# Patient Record
Sex: MALE | Race: BLACK OR AFRICAN AMERICAN | NOT HISPANIC OR LATINO | URBAN - METROPOLITAN AREA
[De-identification: names, ages, dates, MRNs, and addresses within clinical notes are randomized per-mention and may not be internally consistent; named-entity substitution may affect disease eponyms.]

---

## 2018-05-27 ENCOUNTER — EMERGENCY (EMERGENCY)
Facility: HOSPITAL | Age: 28
LOS: 1 days | Discharge: HOME | End: 2018-05-27
Attending: EMERGENCY MEDICINE | Admitting: EMERGENCY MEDICINE
Payer: COMMERCIAL

## 2018-05-27 VITALS
OXYGEN SATURATION: 99 % | DIASTOLIC BLOOD PRESSURE: 94 MMHG | HEART RATE: 109 BPM | RESPIRATION RATE: 18 BRPM | SYSTOLIC BLOOD PRESSURE: 155 MMHG | TEMPERATURE: 99 F | WEIGHT: 198.86 LBS

## 2018-05-27 VITALS
HEART RATE: 85 BPM | DIASTOLIC BLOOD PRESSURE: 78 MMHG | OXYGEN SATURATION: 99 % | SYSTOLIC BLOOD PRESSURE: 138 MMHG | RESPIRATION RATE: 18 BRPM | TEMPERATURE: 99 F

## 2018-05-27 DIAGNOSIS — Z98.84 BARIATRIC SURGERY STATUS: Chronic | ICD-10-CM

## 2018-05-27 LAB
ALBUMIN SERPL ELPH-MCNC: 4.5 G/DL — SIGNIFICANT CHANGE UP (ref 3.3–5)
ALP SERPL-CCNC: 60 U/L — SIGNIFICANT CHANGE UP (ref 40–120)
ALT FLD-CCNC: 15 U/L — SIGNIFICANT CHANGE UP (ref 10–45)
ANION GAP SERPL CALC-SCNC: 15 MMOL/L — SIGNIFICANT CHANGE UP (ref 5–17)
APPEARANCE UR: CLEAR — SIGNIFICANT CHANGE UP
AST SERPL-CCNC: 23 U/L — SIGNIFICANT CHANGE UP (ref 10–40)
BACTERIA # UR AUTO: PRESENT /HPF
BASOPHILS NFR BLD AUTO: 0.1 % — SIGNIFICANT CHANGE UP (ref 0–2)
BILIRUB SERPL-MCNC: 0.8 MG/DL — SIGNIFICANT CHANGE UP (ref 0.2–1.2)
BILIRUB UR-MCNC: (no result)
BUN SERPL-MCNC: 8 MG/DL — SIGNIFICANT CHANGE UP (ref 7–23)
CALCIUM SERPL-MCNC: 9.6 MG/DL — SIGNIFICANT CHANGE UP (ref 8.4–10.5)
CHLORIDE SERPL-SCNC: 90 MMOL/L — LOW (ref 96–108)
CO2 SERPL-SCNC: 28 MMOL/L — SIGNIFICANT CHANGE UP (ref 22–31)
COLOR SPEC: YELLOW — SIGNIFICANT CHANGE UP
COMMENT - URINE: SIGNIFICANT CHANGE UP
CREAT SERPL-MCNC: 1.06 MG/DL — SIGNIFICANT CHANGE UP (ref 0.5–1.3)
DIFF PNL FLD: (no result)
EOSINOPHIL NFR BLD AUTO: 2.1 % — SIGNIFICANT CHANGE UP (ref 0–6)
EPI CELLS # UR: SIGNIFICANT CHANGE UP /HPF (ref 0–5)
GLUCOSE SERPL-MCNC: 128 MG/DL — HIGH (ref 70–99)
GLUCOSE UR QL: NEGATIVE — SIGNIFICANT CHANGE UP
HCT VFR BLD CALC: 44.3 % — SIGNIFICANT CHANGE UP (ref 39–50)
HGB BLD-MCNC: 15.8 G/DL — SIGNIFICANT CHANGE UP (ref 13–17)
HIV 1+2 AB+HIV1 P24 AG SERPL QL IA: SIGNIFICANT CHANGE UP
KETONES UR-MCNC: 40 MG/DL
LEUKOCYTE ESTERASE UR-ACNC: NEGATIVE — SIGNIFICANT CHANGE UP
LIDOCAIN IGE QN: 581 U/L — HIGH (ref 7–60)
LYMPHOCYTES # BLD AUTO: 12.1 % — LOW (ref 13–44)
MCHC RBC-ENTMCNC: 30.5 PG — SIGNIFICANT CHANGE UP (ref 27–34)
MCHC RBC-ENTMCNC: 35.7 G/DL — SIGNIFICANT CHANGE UP (ref 32–36)
MCV RBC AUTO: 85.5 FL — SIGNIFICANT CHANGE UP (ref 80–100)
MONOCYTES NFR BLD AUTO: 11 % — SIGNIFICANT CHANGE UP (ref 2–14)
NEUTROPHILS NFR BLD AUTO: 74.7 % — SIGNIFICANT CHANGE UP (ref 43–77)
NITRITE UR-MCNC: NEGATIVE — SIGNIFICANT CHANGE UP
PH UR: 5.5 — SIGNIFICANT CHANGE UP (ref 5–8)
PLATELET # BLD AUTO: 246 K/UL — SIGNIFICANT CHANGE UP (ref 150–400)
POTASSIUM SERPL-MCNC: 3.9 MMOL/L — SIGNIFICANT CHANGE UP (ref 3.5–5.3)
POTASSIUM SERPL-SCNC: 3.9 MMOL/L — SIGNIFICANT CHANGE UP (ref 3.5–5.3)
PROT SERPL-MCNC: 8.6 G/DL — HIGH (ref 6–8.3)
PROT UR-MCNC: 100 MG/DL
RBC # BLD: 5.18 M/UL — SIGNIFICANT CHANGE UP (ref 4.2–5.8)
RBC # FLD: 12.5 % — SIGNIFICANT CHANGE UP (ref 10.3–16.9)
RBC CASTS # UR COMP ASSIST: < 5 /HPF — SIGNIFICANT CHANGE UP
SODIUM SERPL-SCNC: 133 MMOL/L — LOW (ref 135–145)
SP GR SPEC: >=1.03 — SIGNIFICANT CHANGE UP (ref 1–1.03)
UROBILINOGEN FLD QL: 1 E.U./DL — SIGNIFICANT CHANGE UP
WBC # BLD: 8.4 K/UL — SIGNIFICANT CHANGE UP (ref 3.8–10.5)
WBC # FLD AUTO: 8.4 K/UL — SIGNIFICANT CHANGE UP (ref 3.8–10.5)
WBC UR QL: < 5 /HPF — SIGNIFICANT CHANGE UP

## 2018-05-27 PROCEDURE — 99284 EMERGENCY DEPT VISIT MOD MDM: CPT | Mod: 25

## 2018-05-27 PROCEDURE — 87389 HIV-1 AG W/HIV-1&-2 AB AG IA: CPT

## 2018-05-27 PROCEDURE — 96374 THER/PROPH/DIAG INJ IV PUSH: CPT

## 2018-05-27 PROCEDURE — 81001 URINALYSIS AUTO W/SCOPE: CPT

## 2018-05-27 PROCEDURE — 74019 RADEX ABDOMEN 2 VIEWS: CPT

## 2018-05-27 PROCEDURE — 96375 TX/PRO/DX INJ NEW DRUG ADDON: CPT

## 2018-05-27 PROCEDURE — 85025 COMPLETE CBC W/AUTO DIFF WBC: CPT

## 2018-05-27 PROCEDURE — 74019 RADEX ABDOMEN 2 VIEWS: CPT | Mod: 26

## 2018-05-27 PROCEDURE — 80053 COMPREHEN METABOLIC PANEL: CPT

## 2018-05-27 PROCEDURE — 36415 COLL VENOUS BLD VENIPUNCTURE: CPT

## 2018-05-27 PROCEDURE — 93010 ELECTROCARDIOGRAM REPORT: CPT

## 2018-05-27 PROCEDURE — 93005 ELECTROCARDIOGRAM TRACING: CPT

## 2018-05-27 PROCEDURE — 83690 ASSAY OF LIPASE: CPT

## 2018-05-27 RX ORDER — MORPHINE SULFATE 50 MG/1
4 CAPSULE, EXTENDED RELEASE ORAL ONCE
Qty: 0 | Refills: 0 | Status: DISCONTINUED | OUTPATIENT
Start: 2018-05-27 | End: 2018-05-27

## 2018-05-27 RX ORDER — SODIUM CHLORIDE 9 MG/ML
1000 INJECTION INTRAMUSCULAR; INTRAVENOUS; SUBCUTANEOUS ONCE
Qty: 0 | Refills: 0 | Status: COMPLETED | OUTPATIENT
Start: 2018-05-27 | End: 2018-05-27

## 2018-05-27 RX ORDER — PANTOPRAZOLE SODIUM 20 MG/1
40 TABLET, DELAYED RELEASE ORAL ONCE
Qty: 0 | Refills: 0 | Status: COMPLETED | OUTPATIENT
Start: 2018-05-27 | End: 2018-05-27

## 2018-05-27 RX ORDER — LIDOCAINE 4 G/100G
10 CREAM TOPICAL ONCE
Qty: 0 | Refills: 0 | Status: COMPLETED | OUTPATIENT
Start: 2018-05-27 | End: 2018-05-27

## 2018-05-27 RX ADMIN — Medication 30 MILLILITER(S): at 09:55

## 2018-05-27 RX ADMIN — PANTOPRAZOLE SODIUM 40 MILLIGRAM(S): 20 TABLET, DELAYED RELEASE ORAL at 09:55

## 2018-05-27 RX ADMIN — MORPHINE SULFATE 4 MILLIGRAM(S): 50 CAPSULE, EXTENDED RELEASE ORAL at 11:40

## 2018-05-27 RX ADMIN — SODIUM CHLORIDE 1000 MILLILITER(S): 9 INJECTION INTRAMUSCULAR; INTRAVENOUS; SUBCUTANEOUS at 09:55

## 2018-05-27 RX ADMIN — LIDOCAINE 10 MILLILITER(S): 4 CREAM TOPICAL at 09:55

## 2018-05-27 RX ADMIN — SODIUM CHLORIDE 1000 MILLILITER(S): 9 INJECTION INTRAMUSCULAR; INTRAVENOUS; SUBCUTANEOUS at 11:18

## 2018-05-27 RX ADMIN — SODIUM CHLORIDE 1000 MILLILITER(S): 9 INJECTION INTRAMUSCULAR; INTRAVENOUS; SUBCUTANEOUS at 11:41

## 2018-05-27 RX ADMIN — MORPHINE SULFATE 4 MILLIGRAM(S): 50 CAPSULE, EXTENDED RELEASE ORAL at 11:52

## 2018-05-27 NOTE — ED ADULT NURSE NOTE - CHPI ED SYMPTOMS NEG
no burning urination/no hematuria/no fever/no chills/no blood in stool/no diarrhea/no dysuria/no abdominal distension

## 2018-05-27 NOTE — ED PROVIDER NOTE - MEDICAL DECISION MAKING DETAILS
Pt with epigastric pain after binge drinking episode, D Dx , Most likley pancreatitis vs gastritis, lipase pending. consider SBO as ho of gastric bypass but lower suspician, will screen with xray. less likely gallbladder pathology, will evaluate RUQ labs. will in meantime empirically treat for gastritis .

## 2018-05-27 NOTE — ED PROVIDER NOTE - OBJECTIVE STATEMENT
26 yo with ho of Gastric bypass 2011, HTN, reports increased binge drinking recently, last drink 4 days ago , no ho of withdrawals or seizures. reports onset of upper abd pain radiating to mid back after last binge episode. no ho of pancreatitis /  gastritis known although had similar pain episode which resolved on own after drinking episode last December.  nausea and vomiting associated, no ho of SBO.  no CP. no lightheadness. no black or bloody stools, + constipation, after took a few percocet's for pain over the last few days, + passing gas.

## 2018-05-27 NOTE — ED PROVIDER NOTE - PROGRESS NOTE DETAILS
pancreatitis confirmed, 3 L NS given, pain improved, advised admission however  pt unwilling, discussed risks, advised liquid diet and follow up tues with pcp.

## 2018-05-31 DIAGNOSIS — I10 ESSENTIAL (PRIMARY) HYPERTENSION: ICD-10-CM

## 2018-05-31 DIAGNOSIS — Z79.891 LONG TERM (CURRENT) USE OF OPIATE ANALGESIC: ICD-10-CM

## 2018-05-31 DIAGNOSIS — Z79.899 OTHER LONG TERM (CURRENT) DRUG THERAPY: ICD-10-CM

## 2018-05-31 DIAGNOSIS — K85.90 ACUTE PANCREATITIS WITHOUT NECROSIS OR INFECTION, UNSPECIFIED: ICD-10-CM

## 2018-05-31 DIAGNOSIS — R10.13 EPIGASTRIC PAIN: ICD-10-CM

## 2018-08-22 ENCOUNTER — INPATIENT (INPATIENT)
Facility: HOSPITAL | Age: 28
LOS: 1 days | Discharge: HOME | DRG: 439 | End: 2018-08-24
Attending: STUDENT IN AN ORGANIZED HEALTH CARE EDUCATION/TRAINING PROGRAM | Admitting: STUDENT IN AN ORGANIZED HEALTH CARE EDUCATION/TRAINING PROGRAM
Payer: COMMERCIAL

## 2018-08-22 VITALS
TEMPERATURE: 98 F | RESPIRATION RATE: 18 BRPM | SYSTOLIC BLOOD PRESSURE: 132 MMHG | OXYGEN SATURATION: 98 % | HEART RATE: 79 BPM | DIASTOLIC BLOOD PRESSURE: 89 MMHG

## 2018-08-22 DIAGNOSIS — I10 ESSENTIAL (PRIMARY) HYPERTENSION: ICD-10-CM

## 2018-08-22 DIAGNOSIS — K85.90 ACUTE PANCREATITIS WITHOUT NECROSIS OR INFECTION, UNSPECIFIED: ICD-10-CM

## 2018-08-22 DIAGNOSIS — Z98.84 BARIATRIC SURGERY STATUS: ICD-10-CM

## 2018-08-22 DIAGNOSIS — Z98.84 BARIATRIC SURGERY STATUS: Chronic | ICD-10-CM

## 2018-08-22 DIAGNOSIS — R63.8 OTHER SYMPTOMS AND SIGNS CONCERNING FOOD AND FLUID INTAKE: ICD-10-CM

## 2018-08-22 DIAGNOSIS — Z29.9 ENCOUNTER FOR PROPHYLACTIC MEASURES, UNSPECIFIED: ICD-10-CM

## 2018-08-22 LAB
ALBUMIN SERPL ELPH-MCNC: 4 G/DL — SIGNIFICANT CHANGE UP (ref 3.3–5)
ALBUMIN SERPL ELPH-MCNC: 4.7 G/DL — SIGNIFICANT CHANGE UP (ref 3.3–5)
ALP SERPL-CCNC: 48 U/L — SIGNIFICANT CHANGE UP (ref 40–120)
ALP SERPL-CCNC: 57 U/L — SIGNIFICANT CHANGE UP (ref 40–120)
ALT FLD-CCNC: 17 U/L — SIGNIFICANT CHANGE UP (ref 10–45)
ALT FLD-CCNC: 22 U/L — SIGNIFICANT CHANGE UP (ref 10–45)
ANION GAP SERPL CALC-SCNC: 13 MMOL/L — SIGNIFICANT CHANGE UP (ref 5–17)
ANION GAP SERPL CALC-SCNC: 17 MMOL/L — SIGNIFICANT CHANGE UP (ref 5–17)
AST SERPL-CCNC: 21 U/L — SIGNIFICANT CHANGE UP (ref 10–40)
AST SERPL-CCNC: 26 U/L — SIGNIFICANT CHANGE UP (ref 10–40)
BASOPHILS NFR BLD AUTO: 0.1 % — SIGNIFICANT CHANGE UP (ref 0–2)
BILIRUB SERPL-MCNC: 0.6 MG/DL — SIGNIFICANT CHANGE UP (ref 0.2–1.2)
BILIRUB SERPL-MCNC: 0.7 MG/DL — SIGNIFICANT CHANGE UP (ref 0.2–1.2)
BUN SERPL-MCNC: 14 MG/DL — SIGNIFICANT CHANGE UP (ref 7–23)
BUN SERPL-MCNC: 17 MG/DL — SIGNIFICANT CHANGE UP (ref 7–23)
CALCIUM SERPL-MCNC: 8.8 MG/DL — SIGNIFICANT CHANGE UP (ref 8.4–10.5)
CALCIUM SERPL-MCNC: 9.4 MG/DL — SIGNIFICANT CHANGE UP (ref 8.4–10.5)
CHLORIDE SERPL-SCNC: 98 MMOL/L — SIGNIFICANT CHANGE UP (ref 96–108)
CHLORIDE SERPL-SCNC: 99 MMOL/L — SIGNIFICANT CHANGE UP (ref 96–108)
CHOLEST SERPL-MCNC: 149 MG/DL — SIGNIFICANT CHANGE UP (ref 10–199)
CO2 SERPL-SCNC: 23 MMOL/L — SIGNIFICANT CHANGE UP (ref 22–31)
CO2 SERPL-SCNC: 23 MMOL/L — SIGNIFICANT CHANGE UP (ref 22–31)
CREAT SERPL-MCNC: 0.9 MG/DL — SIGNIFICANT CHANGE UP (ref 0.5–1.3)
CREAT SERPL-MCNC: 1.03 MG/DL — SIGNIFICANT CHANGE UP (ref 0.5–1.3)
EOSINOPHIL NFR BLD AUTO: 0.6 % — SIGNIFICANT CHANGE UP (ref 0–6)
ETHANOL SERPL-MCNC: 109 MG/DL — HIGH (ref 0–10)
GLUCOSE SERPL-MCNC: 100 MG/DL — HIGH (ref 70–99)
GLUCOSE SERPL-MCNC: 112 MG/DL — HIGH (ref 70–99)
HCT VFR BLD CALC: 41 % — SIGNIFICANT CHANGE UP (ref 39–50)
HDLC SERPL-MCNC: 96 MG/DL — SIGNIFICANT CHANGE UP
HGB BLD-MCNC: 14.6 G/DL — SIGNIFICANT CHANGE UP (ref 13–17)
HIV 1+2 AB+HIV1 P24 AG SERPL QL IA: SIGNIFICANT CHANGE UP
LIDOCAIN IGE QN: >600 U/L — SIGNIFICANT CHANGE UP (ref 7–60)
LIPID PNL WITH DIRECT LDL SERPL: 44 MG/DL — SIGNIFICANT CHANGE UP
LYMPHOCYTES # BLD AUTO: 19.9 % — SIGNIFICANT CHANGE UP (ref 13–44)
MCHC RBC-ENTMCNC: 31.3 PG — SIGNIFICANT CHANGE UP (ref 27–34)
MCHC RBC-ENTMCNC: 35.6 G/DL — SIGNIFICANT CHANGE UP (ref 32–36)
MCV RBC AUTO: 87.8 FL — SIGNIFICANT CHANGE UP (ref 80–100)
MONOCYTES NFR BLD AUTO: 7.6 % — SIGNIFICANT CHANGE UP (ref 2–14)
NEUTROPHILS NFR BLD AUTO: 71.8 % — SIGNIFICANT CHANGE UP (ref 43–77)
PLATELET # BLD AUTO: 292 K/UL — SIGNIFICANT CHANGE UP (ref 150–400)
POTASSIUM SERPL-MCNC: 4.3 MMOL/L — SIGNIFICANT CHANGE UP (ref 3.5–5.3)
POTASSIUM SERPL-MCNC: 4.3 MMOL/L — SIGNIFICANT CHANGE UP (ref 3.5–5.3)
POTASSIUM SERPL-SCNC: 4.3 MMOL/L — SIGNIFICANT CHANGE UP (ref 3.5–5.3)
POTASSIUM SERPL-SCNC: 4.3 MMOL/L — SIGNIFICANT CHANGE UP (ref 3.5–5.3)
PROT SERPL-MCNC: 6.5 G/DL — SIGNIFICANT CHANGE UP (ref 6–8.3)
PROT SERPL-MCNC: 7.4 G/DL — SIGNIFICANT CHANGE UP (ref 6–8.3)
RBC # BLD: 4.67 M/UL — SIGNIFICANT CHANGE UP (ref 4.2–5.8)
RBC # FLD: 13.5 % — SIGNIFICANT CHANGE UP (ref 10.3–16.9)
SODIUM SERPL-SCNC: 135 MMOL/L — SIGNIFICANT CHANGE UP (ref 135–145)
SODIUM SERPL-SCNC: 138 MMOL/L — SIGNIFICANT CHANGE UP (ref 135–145)
TOTAL CHOLESTEROL/HDL RATIO MEASUREMENT: 1.6 RATIO — LOW (ref 3.4–9.6)
TRIGL SERPL-MCNC: 43 MG/DL — SIGNIFICANT CHANGE UP (ref 10–149)
WBC # BLD: 8.1 K/UL — SIGNIFICANT CHANGE UP (ref 3.8–10.5)
WBC # FLD AUTO: 8.1 K/UL — SIGNIFICANT CHANGE UP (ref 3.8–10.5)

## 2018-08-22 PROCEDURE — 76705 ECHO EXAM OF ABDOMEN: CPT | Mod: 26

## 2018-08-22 PROCEDURE — 99223 1ST HOSP IP/OBS HIGH 75: CPT | Mod: GC

## 2018-08-22 PROCEDURE — 93010 ELECTROCARDIOGRAM REPORT: CPT

## 2018-08-22 PROCEDURE — 99285 EMERGENCY DEPT VISIT HI MDM: CPT | Mod: 25

## 2018-08-22 RX ORDER — SODIUM CHLORIDE 9 MG/ML
1000 INJECTION, SOLUTION INTRAVENOUS
Qty: 0 | Refills: 0 | Status: DISCONTINUED | OUTPATIENT
Start: 2018-08-22 | End: 2018-08-24

## 2018-08-22 RX ORDER — ONDANSETRON 8 MG/1
4 TABLET, FILM COATED ORAL EVERY 4 HOURS
Qty: 0 | Refills: 0 | Status: DISCONTINUED | OUTPATIENT
Start: 2018-08-22 | End: 2018-08-24

## 2018-08-22 RX ORDER — MORPHINE SULFATE 50 MG/1
4 CAPSULE, EXTENDED RELEASE ORAL EVERY 4 HOURS
Qty: 0 | Refills: 0 | Status: DISCONTINUED | OUTPATIENT
Start: 2018-08-22 | End: 2018-08-24

## 2018-08-22 RX ORDER — ONDANSETRON 8 MG/1
4 TABLET, FILM COATED ORAL ONCE
Qty: 0 | Refills: 0 | Status: COMPLETED | OUTPATIENT
Start: 2018-08-22 | End: 2018-08-22

## 2018-08-22 RX ORDER — MORPHINE SULFATE 50 MG/1
2 CAPSULE, EXTENDED RELEASE ORAL ONCE
Qty: 0 | Refills: 0 | Status: DISCONTINUED | OUTPATIENT
Start: 2018-08-22 | End: 2018-08-22

## 2018-08-22 RX ORDER — SODIUM CHLORIDE 9 MG/ML
1000 INJECTION INTRAMUSCULAR; INTRAVENOUS; SUBCUTANEOUS ONCE
Qty: 0 | Refills: 0 | Status: COMPLETED | OUTPATIENT
Start: 2018-08-22 | End: 2018-08-22

## 2018-08-22 RX ORDER — MORPHINE SULFATE 50 MG/1
4 CAPSULE, EXTENDED RELEASE ORAL ONCE
Qty: 0 | Refills: 0 | Status: DISCONTINUED | OUTPATIENT
Start: 2018-08-22 | End: 2018-08-22

## 2018-08-22 RX ORDER — MORPHINE SULFATE 50 MG/1
2 CAPSULE, EXTENDED RELEASE ORAL EVERY 4 HOURS
Qty: 0 | Refills: 0 | Status: DISCONTINUED | OUTPATIENT
Start: 2018-08-22 | End: 2018-08-24

## 2018-08-22 RX ORDER — MORPHINE SULFATE 50 MG/1
2 CAPSULE, EXTENDED RELEASE ORAL EVERY 4 HOURS
Qty: 0 | Refills: 0 | Status: DISCONTINUED | OUTPATIENT
Start: 2018-08-22 | End: 2018-08-22

## 2018-08-22 RX ADMIN — ONDANSETRON 4 MILLIGRAM(S): 8 TABLET, FILM COATED ORAL at 04:42

## 2018-08-22 RX ADMIN — MORPHINE SULFATE 4 MILLIGRAM(S): 50 CAPSULE, EXTENDED RELEASE ORAL at 16:50

## 2018-08-22 RX ADMIN — MORPHINE SULFATE 4 MILLIGRAM(S): 50 CAPSULE, EXTENDED RELEASE ORAL at 19:45

## 2018-08-22 RX ADMIN — MORPHINE SULFATE 4 MILLIGRAM(S): 50 CAPSULE, EXTENDED RELEASE ORAL at 22:38

## 2018-08-22 RX ADMIN — SODIUM CHLORIDE 1000 MILLILITER(S): 9 INJECTION INTRAMUSCULAR; INTRAVENOUS; SUBCUTANEOUS at 05:53

## 2018-08-22 RX ADMIN — SODIUM CHLORIDE 150 MILLILITER(S): 9 INJECTION, SOLUTION INTRAVENOUS at 22:39

## 2018-08-22 RX ADMIN — MORPHINE SULFATE 4 MILLIGRAM(S): 50 CAPSULE, EXTENDED RELEASE ORAL at 11:49

## 2018-08-22 RX ADMIN — MORPHINE SULFATE 2 MILLIGRAM(S): 50 CAPSULE, EXTENDED RELEASE ORAL at 15:00

## 2018-08-22 RX ADMIN — SODIUM CHLORIDE 1000 MILLILITER(S): 9 INJECTION INTRAMUSCULAR; INTRAVENOUS; SUBCUTANEOUS at 05:51

## 2018-08-22 RX ADMIN — MORPHINE SULFATE 2 MILLIGRAM(S): 50 CAPSULE, EXTENDED RELEASE ORAL at 14:30

## 2018-08-22 RX ADMIN — MORPHINE SULFATE 2 MILLIGRAM(S): 50 CAPSULE, EXTENDED RELEASE ORAL at 19:56

## 2018-08-22 RX ADMIN — MORPHINE SULFATE 2 MILLIGRAM(S): 50 CAPSULE, EXTENDED RELEASE ORAL at 09:33

## 2018-08-22 RX ADMIN — MORPHINE SULFATE 4 MILLIGRAM(S): 50 CAPSULE, EXTENDED RELEASE ORAL at 04:55

## 2018-08-22 RX ADMIN — MORPHINE SULFATE 2 MILLIGRAM(S): 50 CAPSULE, EXTENDED RELEASE ORAL at 08:27

## 2018-08-22 RX ADMIN — SODIUM CHLORIDE 150 MILLILITER(S): 9 INJECTION, SOLUTION INTRAVENOUS at 19:35

## 2018-08-22 RX ADMIN — MORPHINE SULFATE 2 MILLIGRAM(S): 50 CAPSULE, EXTENDED RELEASE ORAL at 08:12

## 2018-08-22 RX ADMIN — SODIUM CHLORIDE 1000 MILLILITER(S): 9 INJECTION INTRAMUSCULAR; INTRAVENOUS; SUBCUTANEOUS at 04:55

## 2018-08-22 RX ADMIN — SODIUM CHLORIDE 250 MILLILITER(S): 9 INJECTION, SOLUTION INTRAVENOUS at 08:27

## 2018-08-22 RX ADMIN — MORPHINE SULFATE 4 MILLIGRAM(S): 50 CAPSULE, EXTENDED RELEASE ORAL at 05:10

## 2018-08-22 RX ADMIN — MORPHINE SULFATE 4 MILLIGRAM(S): 50 CAPSULE, EXTENDED RELEASE ORAL at 23:00

## 2018-08-22 NOTE — H&P ADULT - PROBLEM SELECTOR PLAN 2
takes lisinopril/HCTZ at home   current SBP in the 130s  will hold anti-hypertensives for now as lisinopril has an increased risk of causing pancreatitis

## 2018-08-22 NOTE — ED PROVIDER NOTE - OBJECTIVE STATEMENT
26 y/o m hx EtOH abuse presents c/o epigastric abd pain, n/v tonight.  Pt stating has been drinking heavier than usual in the past few days, vomiting started acutely last night.   Pt denies fever, diarrhea, CP, SOB, all other ROS negative.

## 2018-08-22 NOTE — H&P ADULT - HISTORY OF PRESENT ILLNESS
27 year old male with PMH HTN, alcohol abuse disorder, Gastric bypass (2011) and on Truvada (prep) who p/w severe epigastric pain and NBNB vomiting since 3AM. Patient went out last night to a bar and had 5-6 strong mixed drinks. He then began to have epigastric pain and NBNB vomiting and presented to [facility] ED at 4AM. The pain is 7/10 and is worse with positional changes such as sitting up and pain improves lying on his side. He had about 3 episodes of NBNB vomiting that continued when he was in the ER and stopped after he was given Zofran. 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 He is not sure what precipitates his drinking if it is stress related but does endorse alcoholism runs in his family. He also see a psychiatrist "Dr. CRAIN" who sees him for his alcohol use disorder. He does feel guilty and believes he needs help and has tried to quit drinking without any success, but currently has no time to go to rehab secondary to being in medical school. He denies any chest pain, shortness of breath, cough, weight loss, night sweats, fevers, chills or diarrhea.   Vitals in ED: T: 98.4, HR: 79, BP: 132/89, RR: 18, SpO2: 98% on RA. Total of 4mg morphine given for pain, zofran given for nausea and 2L NS given. 27 year old male with PMH HTN, alcohol abuse disorder, Gastric bypass (2011) and on Truvada (prep) who p/w severe epigastric pain and NBNB vomiting since 3AM. Patient went out last night to a bar and had 5-6 strong mixed drinks. He then began to have epigastric pain and NBNB vomiting and presented to [facility] ED at 4AM. The pain is 7/10 and is worse with positional changes such as sitting up and pain improves lying on his side. He had about 3 episodes of NBNB vomiting that continued when he was in the ER and stopped after he was given Zofran. 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 He is not sure what precipitates his drinking if it is stress related but does endorse alcoholism runs in his family. He also see a psychiatrist "Dr. CRAIN" who sees him for his alcohol use disorder. He does feel guilty and believes he needs help and has tried to quit drinking without any success, but currently has no time to go to rehab secondary to being in medical school. He does not endorse eating fatty foods. He denies any chest pain, shortness of breath, cough, weight loss, night sweats, fevers, chills or diarrhea.   Vitals in ED: T: 98.4, HR: 79, BP: 132/89, RR: 18, SpO2: 98% on RA. Total of 4mg morphine given for pain, zofran given for nausea and 2L NS given. Lipase at 581.

## 2018-08-22 NOTE — H&P ADULT - PROBLEM SELECTOR PLAN 1
Patient presented with epigastric pain and NBNB vomiting after binge drinking  chronic alcoholic s/p 5-6 mixed drinks; last drink at 2AM last night   current CIWA is 1; only complains of nausea; no headache, tremors or anxiety   No SIRS criteria met; patient without any leukocytosis, no fever, not tachycardic or tachypneic and is stable   lipase elevated at 581  BAL at 109   s/p 2L NS   s/p zofran and 4mg morphine  pain controlled with morphine and will continue   started on LR at 250/hour   f/u lipid panel   will advance diet as tolerated Patient presented with epigastric pain and NBNB vomiting after binge drinking  chronic alcoholic s/p 5-6 mixed drinks; last drink at 2AM last night   current CIWA is 1; only complains of nausea; no headache, tremors or anxiety   No SIRS criteria met; patient without any leukocytosis, no fever, not tachycardic or tachypneic and is stable   lipase elevated at 581  BAL at 109   s/p 2L NS   s/p zofran and 4mg morphine  pain controlled with morphine and will continue   started on LR at 250/hour   f/u lipid panel   f/u repeat CMP   will advance diet as tolerated Patient presented with epigastric pain and NBNB vomiting after binge drinking  chronic alcoholic s/p 5-6 mixed drinks; last drink at 2AM last night   current CIWA is 1; only complains of nausea; no headache, tremors or anxiety   No SIRS criteria met; patient without any leukocytosis, no fever, not tachycardic or tachypneic and is stable   lipase elevated at 581  BAL at 109   s/p 2L NS   s/p zofran and 4mg morphine  pain controlled with morphine and will continue   started on LR at 250/hour   f/u lipid panel   f/u repeat CMP   less likely gallstone induced pancreatitis but will r/o with abdominal u/s   will advance diet as tolerated

## 2018-08-22 NOTE — ED PROVIDER NOTE - ATTENDING CONTRIBUTION TO CARE
increased abd pain with history of alcohol abuse.  labs suggestive of pancreatitis.  abd soft but tender epigastric.  otherwise non focal exam.  plan ivf hydration, pain control.  admit for further treatment

## 2018-08-22 NOTE — H&P ADULT - ASSESSMENT
This is a 27 year old male with PMH HTN, gastric bypass, and on Truvada for prep who p/w epigastric pain and NBNB vomiting found to have alcohol induced pancreatitis.

## 2018-08-22 NOTE — H&P ADULT - NSHPPHYSICALEXAM_GEN_ALL_CORE
Vital Signs Last 12 Hrs  T(F): 98.2 (08-22-18 @ 05:55), Max: 98.4 (08-22-18 @ 03:41)  HR: 74 (08-22-18 @ 05:55) (74 - 79)  BP: 134/81 (08-22-18 @ 05:55) (132/89 - 134/81)  BP(mean): --  RR: 18 (08-22-18 @ 05:55) (18 - 18)  SpO2: 98% (08-22-18 @ 05:55) (98% - 98%)    PHYSICAL EXAM:  Constitutional: NAD, comfortable in bed.  HEENT: NC/AT, PERRLA, EOMI, no conjunctival pallor or scleral icterus, MMM  Neck: Supple, no JVD  Respiratory: Normal rate, rhythm, depth, effort. CTAB. No w/r/r.   Cardiovascular: RRR, normal S1 and S2, no m/r/g.   Gastrointestinal: +BS, soft NTND, epigastric tenderness to deep palpation, without any guarding or rebound tenderness, no palpable masses   Extremities: wwp; no cyanosis, clubbing or edema.   Vascular: Pulses equal and strong throughout.   Neurological: AAOx3, no CN deficits, strength and sensation intact throughout.   Skin: No gross skin abnormalities or rashes

## 2018-08-22 NOTE — H&P ADULT - PROBLEM SELECTOR PLAN 4
Low risk no need for DVT ppx   patient mobile and no need for SCDs/heparin Low risk no need for DVT ppx   patient mobile and no need for SCDs/heparin  On Truvada for prep; currently not sexually active and no recent exposure and has agreed to get HIV tested in hospital Low risk no need for DVT ppx   patient mobile. ambulatory. IMPROVE score 0.   no need for SCDs/heparin  On Truvada for prep; currently not sexually active and no recent exposure and has agreed to get HIV tested in hospital

## 2018-08-22 NOTE — ED ADULT NURSE NOTE - NSIMPLEMENTINTERV_GEN_ALL_ED
Implemented All Universal Safety Interventions:  [place] to call system. Call bell, personal items and telephone within reach. Instruct patient to call for assistance. Room bathroom lighting operational. Non-slip footwear when patient is off stretcher. Physically safe environment: no spills, clutter or unnecessary equipment. Stretcher in lowest position, wheels locked, appropriate side rails in place.

## 2018-08-22 NOTE — ED PROVIDER NOTE - MEDICAL DECISION MAKING DETAILS
28 y/o m hx EtOH abuse presents with n/v, epigastric pain x today; vss, vomiting improved with reglan, given IV hydration, labs showing lipase >600, pancreatitis likely 2/2 EtOH, will admit for further IV hydration

## 2018-08-22 NOTE — H&P ADULT - NSHPLABSRESULTS_GEN_ALL_CORE
LABS:                        14.6   8.1   )-----------( 292      ( 22 Aug 2018 04:11 )             41.0     08-22    138  |  98  |  17  ----------------------------<  112<H>  4.3   |  23  |  1.03    Ca    9.4      22 Aug 2018 04:11    TPro  7.4  /  Alb  4.7  /  TBili  0.6  /  DBili  x   /  AST  26  /  ALT  22  /  AlkPhos  57  08-22

## 2018-08-22 NOTE — H&P ADULT - PROBLEM SELECTOR PLAN 5
F: LR 250cc/hour  E: Replete for K<4 and Mg<2   N: NPO and advanced as tolerated      Dispo: Full Code

## 2018-08-22 NOTE — ED ADULT NURSE REASSESSMENT NOTE - NS ED NURSE REASSESS COMMENT FT1
Pt c/o increasing abdominal pain. Spoke w/ resident. Instructed to give 4 mg of Morphine for breakthrough pain. VSS. WIll continue to monitor.
Received pt from [facility] - pt admitted w acute pancreatitis - medicated for pain as ordered, IVF in progress. Lab and CT results reviewed. Currently pt resting in NAD, watching TV. Will monitor for pain relief.

## 2018-08-22 NOTE — H&P ADULT - NSHPSOCIALHISTORY_GEN_ALL_CORE
Drinks 20 mixed drinks per week for the past 4 years   No cigarette smoking   Smokes marijuana once per week   No IVDA or other drugs used

## 2018-08-23 LAB
ALBUMIN SERPL ELPH-MCNC: 3.6 G/DL — SIGNIFICANT CHANGE UP (ref 3.3–5)
ALP SERPL-CCNC: 43 U/L — SIGNIFICANT CHANGE UP (ref 40–120)
ALT FLD-CCNC: 13 U/L — SIGNIFICANT CHANGE UP (ref 10–45)
ANION GAP SERPL CALC-SCNC: 11 MMOL/L — SIGNIFICANT CHANGE UP (ref 5–17)
AST SERPL-CCNC: 18 U/L — SIGNIFICANT CHANGE UP (ref 10–40)
BILIRUB SERPL-MCNC: 1.1 MG/DL — SIGNIFICANT CHANGE UP (ref 0.2–1.2)
BUN SERPL-MCNC: 9 MG/DL — SIGNIFICANT CHANGE UP (ref 7–23)
CALCIUM SERPL-MCNC: 8.8 MG/DL — SIGNIFICANT CHANGE UP (ref 8.4–10.5)
CHLORIDE SERPL-SCNC: 98 MMOL/L — SIGNIFICANT CHANGE UP (ref 96–108)
CO2 SERPL-SCNC: 25 MMOL/L — SIGNIFICANT CHANGE UP (ref 22–31)
CREAT SERPL-MCNC: 0.81 MG/DL — SIGNIFICANT CHANGE UP (ref 0.5–1.3)
GLUCOSE SERPL-MCNC: 118 MG/DL — HIGH (ref 70–99)
HCT VFR BLD CALC: 39.2 % — SIGNIFICANT CHANGE UP (ref 39–50)
HGB BLD-MCNC: 13.1 G/DL — SIGNIFICANT CHANGE UP (ref 13–17)
MCHC RBC-ENTMCNC: 30.3 PG — SIGNIFICANT CHANGE UP (ref 27–34)
MCHC RBC-ENTMCNC: 33.4 G/DL — SIGNIFICANT CHANGE UP (ref 32–36)
MCV RBC AUTO: 90.5 FL — SIGNIFICANT CHANGE UP (ref 80–100)
PLATELET # BLD AUTO: 213 K/UL — SIGNIFICANT CHANGE UP (ref 150–400)
POTASSIUM SERPL-MCNC: 3.8 MMOL/L — SIGNIFICANT CHANGE UP (ref 3.5–5.3)
POTASSIUM SERPL-SCNC: 3.8 MMOL/L — SIGNIFICANT CHANGE UP (ref 3.5–5.3)
PROT SERPL-MCNC: 5.9 G/DL — LOW (ref 6–8.3)
RBC # BLD: 4.33 M/UL — SIGNIFICANT CHANGE UP (ref 4.2–5.8)
RBC # FLD: 14 % — SIGNIFICANT CHANGE UP (ref 10.3–16.9)
SODIUM SERPL-SCNC: 134 MMOL/L — LOW (ref 135–145)
WBC # BLD: 8.1 K/UL — SIGNIFICANT CHANGE UP (ref 3.8–10.5)
WBC # FLD AUTO: 8.1 K/UL — SIGNIFICANT CHANGE UP (ref 3.8–10.5)

## 2018-08-23 PROCEDURE — 99233 SBSQ HOSP IP/OBS HIGH 50: CPT | Mod: GC

## 2018-08-23 RX ORDER — HYDROCHLOROTHIAZIDE 25 MG
25 TABLET ORAL DAILY
Qty: 0 | Refills: 0 | Status: DISCONTINUED | OUTPATIENT
Start: 2018-08-23 | End: 2018-08-23

## 2018-08-23 RX ORDER — ACETAMINOPHEN 500 MG
650 TABLET ORAL ONCE
Qty: 0 | Refills: 0 | Status: COMPLETED | OUTPATIENT
Start: 2018-08-23 | End: 2018-08-23

## 2018-08-23 RX ORDER — LISINOPRIL 2.5 MG/1
20 TABLET ORAL DAILY
Qty: 0 | Refills: 0 | Status: DISCONTINUED | OUTPATIENT
Start: 2018-08-23 | End: 2018-08-24

## 2018-08-23 RX ORDER — EMTRICITABINE AND TENOFOVIR DISOPROXIL FUMARATE 200; 300 MG/1; MG/1
1 TABLET, FILM COATED ORAL DAILY
Qty: 0 | Refills: 0 | Status: DISCONTINUED | OUTPATIENT
Start: 2018-08-23 | End: 2018-08-24

## 2018-08-23 RX ORDER — POTASSIUM CHLORIDE 20 MEQ
20 PACKET (EA) ORAL ONCE
Qty: 0 | Refills: 0 | Status: COMPLETED | OUTPATIENT
Start: 2018-08-23 | End: 2018-08-23

## 2018-08-23 RX ADMIN — EMTRICITABINE AND TENOFOVIR DISOPROXIL FUMARATE 1 TABLET(S): 200; 300 TABLET, FILM COATED ORAL at 19:03

## 2018-08-23 RX ADMIN — MORPHINE SULFATE 2 MILLIGRAM(S): 50 CAPSULE, EXTENDED RELEASE ORAL at 21:30

## 2018-08-23 RX ADMIN — Medication 650 MILLIGRAM(S): at 20:58

## 2018-08-23 RX ADMIN — Medication 20 MILLIEQUIVALENT(S): at 18:56

## 2018-08-23 RX ADMIN — LISINOPRIL 20 MILLIGRAM(S): 2.5 TABLET ORAL at 06:45

## 2018-08-23 RX ADMIN — SODIUM CHLORIDE 200 MILLILITER(S): 9 INJECTION, SOLUTION INTRAVENOUS at 18:56

## 2018-08-23 RX ADMIN — MORPHINE SULFATE 4 MILLIGRAM(S): 50 CAPSULE, EXTENDED RELEASE ORAL at 03:35

## 2018-08-23 RX ADMIN — SODIUM CHLORIDE 200 MILLILITER(S): 9 INJECTION, SOLUTION INTRAVENOUS at 20:58

## 2018-08-23 RX ADMIN — MORPHINE SULFATE 4 MILLIGRAM(S): 50 CAPSULE, EXTENDED RELEASE ORAL at 09:07

## 2018-08-23 RX ADMIN — MORPHINE SULFATE 4 MILLIGRAM(S): 50 CAPSULE, EXTENDED RELEASE ORAL at 19:48

## 2018-08-23 RX ADMIN — MORPHINE SULFATE 4 MILLIGRAM(S): 50 CAPSULE, EXTENDED RELEASE ORAL at 04:00

## 2018-08-23 RX ADMIN — MORPHINE SULFATE 2 MILLIGRAM(S): 50 CAPSULE, EXTENDED RELEASE ORAL at 20:58

## 2018-08-23 RX ADMIN — MORPHINE SULFATE 4 MILLIGRAM(S): 50 CAPSULE, EXTENDED RELEASE ORAL at 18:47

## 2018-08-23 RX ADMIN — MORPHINE SULFATE 4 MILLIGRAM(S): 50 CAPSULE, EXTENDED RELEASE ORAL at 10:22

## 2018-08-23 RX ADMIN — Medication 25 MILLIGRAM(S): at 06:45

## 2018-08-23 RX ADMIN — MORPHINE SULFATE 4 MILLIGRAM(S): 50 CAPSULE, EXTENDED RELEASE ORAL at 23:16

## 2018-08-23 NOTE — PROGRESS NOTE ADULT - PROBLEM SELECTOR PLAN 1
Patient presented with epigastric pain and NBNB vomiting after binge drinking  chronic alcohol consumption; last drink at 2AM 8/21, CIWA is still 1; continuing to complain of abdominal pain.     -lipase elevated at 581  -BAL at 109   -s/p 2L NS   -s/p zofran and 4mg morphine  -pain controlled with morphine and will continue   -decrease LR at 200/hour   -lipid panel wnl  - daily CMP   - no gallstone seen on US  - will advance diet as tolerated

## 2018-08-23 NOTE — DISCHARGE NOTE ADULT - PATIENT PORTAL LINK FT
You can access the [facility], offered by [facility], by registering with the following website: [email or web address]

## 2018-08-23 NOTE — DISCHARGE NOTE ADULT - CARE PLAN
Principal Discharge DX:	Alcohol-induced acute pancreatitis without infection or necrosis  Goal:	Decreased pain and ability to take PO: resolution of pancreatitis.  Assessment and plan of treatment:	You came into the hospital with severe epigastric pain and vomiting. Due to the fact that you had just consumed alcohol, and because of your past history of pancreatitis, this was thought to be the most common etiology of your presentation. You were given large amounts of IV fluids to help treat your condition. Additionally, you were given Zofran for relief of your nausea. Finally, you were given morphine for your pain. Please follow-up with your outpatient primary care provider and psychiatrist as needed for this condition.  Secondary Diagnosis:	HTN (hypertension)  Assessment and plan of treatment:	This is a chronic condition for you. We initially held your home medication as both lisinopril and HCTZ can contribute to pancreatitis. You were eventually started back on lisinopril, with good control of your blood pressure. Please take your regular home medications and continue to follow with your outpatient PCP for this condition. Principal Discharge DX:	Alcohol-induced acute pancreatitis without infection or necrosis  Goal:	Decreased pain and ability to take PO: resolution of pancreatitis.  Assessment and plan of treatment:	You came into the hospital with severe epigastric pain and vomiting. Due to the fact that you had just consumed alcohol, and because of your past history of pancreatitis, this was thought to be the most common etiology of your presentation. You were given large amounts of IV fluids to help treat your condition. Additionally, you were given Zofran for relief of your nausea. Finally, you were given morphine for your pain. Please follow-up with your outpatient primary care provider and psychiatrist as needed for this condition.  Secondary Diagnosis:	HTN (hypertension)  Assessment and plan of treatment:	This is a chronic condition for you. We initially held your home medication as both lisinopril and HCTZ can contribute to pancreatitis. You were eventually started back on lisinopril, with good control of your blood pressure. Please follow with your outpatient PCP for this condition and address  your hypertension regimen accordingly.

## 2018-08-23 NOTE — PROGRESS NOTE ADULT - SUBJECTIVE AND OBJECTIVE BOX
Patient is a 27y old  Male who presents with a chief complaint of Epigastric pain and vomiting (23 Aug 2018 11:23)      INTERVAL HPI/OVERNIGHT EVENTS: Patient still having 6/10 epigastric pain, not worse with clear, persistent, nonradiating.     Review of Systems: 12 point review of systems otherwise negative      MEDICATIONS  (STANDING):  lactated ringers. 1000 milliLiter(s) (150 mL/Hr) IV Continuous <Continuous>  lisinopril 20 milliGRAM(s) Oral daily    MEDICATIONS  (PRN):  morphine  - Injectable 4 milliGRAM(s) IV Push every 4 hours PRN Severe Pain (7 - 10)  morphine  - Injectable 2 milliGRAM(s) IV Push every 4 hours PRN Severe Pain (7 - 10)  ondansetron Injectable 4 milliGRAM(s) IV Push every 4 hours PRN Nausea      Allergies    No Known Allergies    Intolerances          Vital Signs Last 24 Hrs  T(C): 36.8 (23 Aug 2018 08:57), Max: 37.2 (23 Aug 2018 05:21)  T(F): 98.3 (23 Aug 2018 08:57), Max: 98.9 (23 Aug 2018 05:21)  HR: 77 (23 Aug 2018 08:57) (72 - 85)  BP: 145/90 (23 Aug 2018 08:57) (134/84 - 151/85)  BP(mean): --  RR: 17 (23 Aug 2018 08:57) (15 - 18)  SpO2: 98% (23 Aug 2018 08:57) (98% - 99%)  CAPILLARY BLOOD GLUCOSE          08-22 @ 07:01  -  08-23 @ 07:00  --------------------------------------------------------  IN: 750 mL / OUT: 0 mL / NET: 750 mL        Physical Exam:    Daily Height in cm: 172.72 (22 Aug 2018 22:01)    Daily   General:  Well appearing, NAD, not cachetic  HEENT:  Nonicteric, PERRLA  CV:  RRR, no murmur, no JVD  Lungs:  CTA B/L, no wheezes, rales, rhonchi  Abdomen:  Soft, tender epigastric pain  Extremities:  2+ pulses, no c/c, no edema  Skin:  Warm and dry, no rashes  :  No noa  Neuro:  AAOx3, non-focal  No Restraints    LABS:                        13.1   8.1   )-----------( 213      ( 23 Aug 2018 07:34 )             39.2     08-23    134<L>  |  98  |  9   ----------------------------<  118<H>  3.8   |  25  |  0.81    Ca    8.8      23 Aug 2018 07:34    TPro  5.9<L>  /  Alb  3.6  /  TBili  1.1  /  DBili  x   /  AST  18  /  ALT  13  /  AlkPhos  43  08-23            RADIOLOGY & ADDITIONAL TESTS:    ---------------------------------------------------------------------------  I personally reviewed: [  ]EKG   [  ]CXR    [  ] CT    [  ]Other  ---------------------------------------------------------------------------  PLEASE CHECK WHEN PRESENT:     [  ]Heart Failure     [  ] Acute     [  ] Acute on Chronic     [  ] Chronic  -------------------------------------------------------------------     [  ]Diastolic [HFpEF]     [  ]Systolic [HFrEF]     [  ]Combined [HFpEF & HFrEF]     [  ]Other:  -------------------------------------------------------------------  [  ]PALMA     [  ]ATN     [  ]Reneal Medullary Necrosis     [  ]Renal Cortical Necrosis     [  ]Other Pathological Lesions:    [  ]CKD 1  [  ]CKD 2  [  ]CKD 3  [  ]CKD 4  [  ]CKD 5  [  ]Other  -------------------------------------------------------------------  [  ]Other/Unspecified:    --------------------------------------------------------------------    Abdominal Nutritional Status  [  ]Malnutrition: See Nutrition Note  [  ]Cachexia  [  ]Other:   [  ]Supplement Ordered:  [  ]Morbid Obesity (BMI >=40]

## 2018-08-23 NOTE — DISCHARGE NOTE ADULT - MEDICATION SUMMARY - MEDICATIONS TO TAKE
I will START or STAY ON the medications listed below when I get home from the hospital:    lisinopril 20 mg oral tablet  -- 1 tab(s) by mouth once a day MDD:1  -- Do not take this drug if you are pregnant.  It is very important that you take or use this exactly as directed.  Do not skip doses or discontinue unless directed by your doctor.  Some non-prescription drugs may aggravate your condition.  Read all labels carefully.  If a warning appears, check with your doctor before taking.    -- Indication: For Hypertension    emtricitabine-tenofovir disoproxil 200 mg-300 mg oral tablet  -- 1 tab(s) by mouth once a day  -- Indication: For PrEP    docusate sodium 100 mg oral capsule  -- 1 cap(s) by mouth 2 times a day  -- Indication: For Hard stool

## 2018-08-23 NOTE — DISCHARGE NOTE ADULT - HOSPITAL COURSE
The patient is a 27 year old male with PMH HTN, alcohol abuse disorder, Gastric bypass (2011) and on Truvada (prep) who p/w severe epigastric pain and NBNB vomiting since 3AM. 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 He does not endorse eating fatty foods. He denies any chest pain, shortness of breath, cough, weight loss, night sweats, fevers, chills or diarrhea.   Vitals in ED: T: 98.4, HR: 79, BP: 132/89, RR: 18, SpO2: 98% on RA. Total of 4mg morphine given for pain, zofran given for nausea and 2L NS given. Lipase at 581. The patient is a 27 year old male with PMH HTN, alcohol abuse disorder, Gastric bypass (2011) and on Truvada (prep) who p/w severe epigastric pain and NBNB vomiting since 3AM. 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 He does not endorse eating fatty foods. He denies any chest pain, shortness of breath, cough, weight loss, night sweats, fevers, chills or diarrhea.     Vitals in ED: T: 98.4, HR: 79, BP: 132/89, RR: 18, SpO2: 98% on RA. Total of 4mg morphine given for pain, zofran given for nausea and 2L NS given. Lipase at 581.     Patient was admitted for acute pancreatitis. He continued to have epigastric pain, which was better controlled after he got pain medications. He was also continued on LR for fluids at 150 mL/hr. Patient began to improve with PO intake and as his symptoms were improving, so he was discharged home. The patient is a 27 year old male with PMH HTN, alcohol abuse disorder, Gastric bypass (2011) and on Truvada (prep) who p/w severe epigastric pain and NBNB vomiting since 3AM. 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 He does not endorse eating fatty foods. He denies any chest pain, shortness of breath, cough, weight loss, night sweats, fevers, chills or diarrhea.     Vitals in ED: T: 98.4, HR: 79, BP: 132/89, RR: 18, SpO2: 98% on RA. Total of 4mg morphine given for pain, zofran given for nausea and 2L NS given. Lipase at 581.     Patient was admitted for acute pancreatitis. He continued to have epigastric pain, which was better controlled after he got pain medications. He was also continued on LR for fluids at 150 mL/hr.  During his hospital course he experienced ileus which resolved with decreased pain medication requirements and ambulating. After stooling and tolerating po diet, patient was deemed safe for discharge home.     Prior to discharge home, patient had a temperature of 100.0, with no clinical sign of active infection. Given patient preference and lack of clinical correlation, improvement with bowel movements, diet tolerance and overall resolution of symptoms, patient was considered stable for discharge. The patient is a 27 year old male with PMH HTN, alcohol abuse disorder, Gastric bypass (2011) and on Truvada (prep) who p/w severe epigastric pain and NBNB vomiting since 3AM. 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 He does not endorse eating fatty foods. He denies any chest pain, shortness of breath, cough, weight loss, night sweats, fevers, chills or diarrhea.     Vitals in ED: T: 98.4, HR: 79, BP: 132/89, RR: 18, SpO2: 98% on RA. Total of 4mg morphine given for pain, zofran given for nausea and 2L NS given. Lipase at 581.     Patient was admitted for acute pancreatitis. He continued to have epigastric pain, which was better controlled after he got pain medications. He was also continued on LR for fluids at 150 mL/hr.  During his hospital course he experienced ileus which resolved with decreased pain medication requirements and ambulating. After stooling and tolerating po diet, patient was deemed safe for discharge home.     Prior to discharge home, patient had a temperature of 100.0, with no clinical sign of active infection. xray of abdomen indicated thickened areas of bowel wall that in the proper clinical setting could be colitis, however this was not apparent in patient whose abdominal symptoms had resolved on interview and exam prior to discharge. Given patient preference and lack of clinical correlation, improvement with bowel movements, diet tolerance and overall resolution of symptoms, patient was considered stable for discharge.

## 2018-08-23 NOTE — DISCHARGE NOTE ADULT - PROVIDER TOKENS
FREE:[LAST:[stefan],FIRST:[Vipin],PHONE:[phone]],FAX:[(   [phone]    -],ADDRESS:[[address] [place] [address]]]

## 2018-08-23 NOTE — PROGRESS NOTE ADULT - PROBLEM SELECTOR PLAN 5
F: LR 200cc/hour  E: Replete for K<4 and Mg<2   N: NPO and advanced as tolerated      Dispo: Full Code

## 2018-08-23 NOTE — DISCHARGE NOTE ADULT - OTHER SIGNIFICANT FINDINGS
.  LABS:                         13.1   8.1   )-----------( 213      ( 23 Aug 2018 07:34 )             39.2     08-23    134<L>  |  98  |  9   ----------------------------<  118<H>  3.8   |  25  |  0.81    Ca    8.8      23 Aug 2018 07:34    TPro  5.9<L>  /  Alb  3.6  /  TBili  1.1  /  DBili  x   /  AST  18  /  ALT  13  /  AlkPhos  43  08-23    Lipase, Serum: >600 U/L (08.22.18 @ 04:11)    US Abdomen: 1.  Heterogeneous pancreas with suggestion of peripancreatic fluid and small amount of ascites suggesting pancreatitis. Correlation with amylase   and lipase levels as well as CT of abdomen and pelvis is recommended.  2.  Mildly dilated midportion of the common bile duct measures 0.7 cm   which tapers smoothly to a normal caliber more distally.   Echogenic right kidney suggesting medical renal disease clinical   correlation is recommended. .  LABS:                         13.1   8.1   )-----------( 213      ( 23 Aug 2018 07:34 )             39.2     08-23    134<L>  |  98  |  9   ----------------------------<  118<H>  3.8   |  25  |  0.81    Ca    8.8      23 Aug 2018 07:34    TPro  5.9<L>  /  Alb  3.6  /  TBili  1.1  /  DBili  x   /  AST  18  /  ALT  13  /  AlkPhos  43  08-23    Lipase, Serum: >600 U/L (08.22.18 @ 04:11)    US Abdomen: 1.  Heterogeneous pancreas with suggestion of peripancreatic fluid and small amount of ascites suggesting pancreatitis. Correlation with amylase   and lipase levels as well as CT of abdomen and pelvis is recommended.  2.  Mildly dilated midportion of the common bile duct measures 0.7 cm   which tapers smoothly to a normal caliber more distally.   Echogenic right kidney suggesting medical renal disease clinical   correlation is recommended.    < from: Xray Abdomen 1 View PORTABLE -Urgent (08.24.18 @ 09:27) >  IMPRESSION:       Mural thickening involving the mid descending colon as well as possibly   the sigmoid colon and a colitis cannot be excluded. Further imaging to   include a contrast-enhanced CT of the abdomen and pelvis as clinically   indicated.   Nonobstructive bowel gas pattern.     The results of this examination were verbally communicated with read back   to the physician, Carlos Archer, on 8/24/2018 6:19 PM.        < end of copied text >

## 2018-08-23 NOTE — PROGRESS NOTE ADULT - PROBLEM SELECTOR PLAN 4
Low risk no need for DVT ppx   patient mobile. ambulatory. IMPROVE score 0.   no need for SCDs/heparin  On Truvada for prep; currently not sexually active and no recent exposure and has agreed to get HIV tested in hospital

## 2018-08-23 NOTE — DISCHARGE NOTE ADULT - PLAN OF CARE
Decreased pain and ability to take PO: resolution of pancreatitis. You came into the hospital with severe epigastric pain and vomiting. Due to the fact that you had just consumed alcohol, and because of your past history of pancreatitis, this was thought to be the most common etiology of your presentation. You were given large amounts of IV fluids to help treat your condition. Additionally, you were given Zofran for relief of your nausea. Finally, you were given morphine for your pain. Please follow-up with your outpatient primary care provider and psychiatrist as needed for this condition. This is a chronic condition for you. We initially held your home medication as both lisinopril and HCTZ can contribute to pancreatitis. You were eventually started back on lisinopril, with good control of your blood pressure. Please take your regular home medications and continue to follow with your outpatient PCP for this condition. This is a chronic condition for you. We initially held your home medication as both lisinopril and HCTZ can contribute to pancreatitis. You were eventually started back on lisinopril, with good control of your blood pressure. Please follow with your outpatient PCP for this condition and address  your hypertension regimen accordingly.

## 2018-08-23 NOTE — PROGRESS NOTE ADULT - SUBJECTIVE AND OBJECTIVE BOX
INTERVAL HPI/OVERNIGHT EVENTS: Overnight, the patient was admitted to the hospital and continued on the plan set forth by admitting team    SUBJECTIVE: Patient seen and examined at bedside. This morning I saw the patient before he was supposed to get his next dose of pain medications. Patient said that his pain has not improved, but that last time he had this happen to him, it took 3-4 days before he felt significantly better. Has had some nausea but no vomiting (since starting Zofran). Otherwise, he denies any cp, sob, cough, or diarrhea at this time.     OBJECTIVE:    VITAL SIGNS:  ICU Vital Signs Last 24 Hrs  T(C): 36.8 (23 Aug 2018 08:57), Max: 37.2 (23 Aug 2018 05:21)  T(F): 98.3 (23 Aug 2018 08:57), Max: 98.9 (23 Aug 2018 05:21)  HR: 77 (23 Aug 2018 08:57) (74 - 85)  BP: 145/90 (23 Aug 2018 08:57) (134/84 - 151/85)  BP(mean): --  ABP: --  ABP(mean): --  RR: 17 (23 Aug 2018 08:57) (15 - 18)  SpO2: 98% (23 Aug 2018 08:57) (98% - 99%)        08-22 @ 07:01  -  08-23 @ 07:00  --------------------------------------------------------  IN: 750 mL / OUT: 0 mL / NET: 750 mL      CAPILLARY BLOOD GLUCOSE      PHYSICAL EXAM:    General: NAD, young man laying still in bed in NAD  HEENT: NC/AT; PERRL, clear conjunctiva  Neck: supple, no JVD  Respiratory: lungs CTA b/l, no wheezes or rhonchi  Cardiovascular: +S1/S2; RRR, no murmurs, rubs, or gallops  Abdomen: soft, non-distended, tender in all quadrants with most tenderness with minimal pressure in epigastric area; bowel sounds decreased  Extremities: WWP, 2+ peripheral pulses b/l; no LE edema  Skin: normal color and turgor; no rash  Neurological: AOx3, no focal deficits noted    MEDICATIONS:  MEDICATIONS  (STANDING):  lactated ringers. 1000 milliLiter(s) (150 mL/Hr) IV Continuous <Continuous>  lisinopril 20 milliGRAM(s) Oral daily    MEDICATIONS  (PRN):  morphine  - Injectable 4 milliGRAM(s) IV Push every 4 hours PRN Severe Pain (7 - 10)  morphine  - Injectable 2 milliGRAM(s) IV Push every 4 hours PRN Severe Pain (7 - 10)  ondansetron Injectable 4 milliGRAM(s) IV Push every 4 hours PRN Nausea      ALLERGIES:  Allergies    No Known Allergies    Intolerances        LABS:                        13.1   8.1   )-----------( 213      ( 23 Aug 2018 07:34 )             39.2     08-23    134<L>  |  98  |  9   ----------------------------<  118<H>  3.8   |  25  |  0.81    Ca    8.8      23 Aug 2018 07:34    TPro  5.9<L>  /  Alb  3.6  /  TBili  1.1  /  DBili  x   /  AST  18  /  ALT  13  /  AlkPhos  43  08-23          RADIOLOGY & ADDITIONAL TESTS: INTERVAL HPI/OVERNIGHT EVENTS: Overnight, the patient was admitted to the hospital and continued on the plan set forth by admitting team    SUBJECTIVE: Patient seen and examined at bedside. This morning I saw the patient before he was supposed to get his next dose of pain medications. Patient said that his pain has not improved, but that last time he had this happen to him, it took 3-4 days before he felt significantly better. Has had some nausea but no vomiting (since starting Zofran). Otherwise, he denies any cp, sob, cough, or diarrhea at this time.     OBJECTIVE:    VITAL SIGNS:  ICU Vital Signs Last 24 Hrs  T(C): 36.8 (23 Aug 2018 08:57), Max: 37.2 (23 Aug 2018 05:21)  T(F): 98.3 (23 Aug 2018 08:57), Max: 98.9 (23 Aug 2018 05:21)  HR: 77 (23 Aug 2018 08:57) (74 - 85)  BP: 145/90 (23 Aug 2018 08:57) (134/84 - 151/85)  BP(mean): --  ABP: --  ABP(mean): --  RR: 17 (23 Aug 2018 08:57) (15 - 18)  SpO2: 98% (23 Aug 2018 08:57) (98% - 99%)        08-22 @ 07:01  -  08-23 @ 07:00  --------------------------------------------------------  IN: 750 mL / OUT: 0 mL / NET: 750 mL      CAPILLARY BLOOD GLUCOSE      PHYSICAL EXAM:    General: NAD, young man laying still in bed in NAD  HEENT: NC/AT; PERRL, clear conjunctiva  Neck: supple, no JVD  Respiratory: lungs CTA b/l, no wheezes or rhonchi  Cardiovascular: +S1/S2; RRR, no murmurs, rubs, or gallops  Abdomen: soft, non-distended, tender in all quadrants with most tenderness with minimal pressure in epigastric area; bowel sounds decreased  Extremities: WWP, 2+ peripheral pulses b/l; no LE edema  Skin: normal color and turgor; no rash  Neurological: AOx3, no focal deficits noted    MEDICATIONS:  MEDICATIONS  (STANDING):  lactated ringers. 1000 milliLiter(s) (150 mL/Hr) IV Continuous <Continuous>  lisinopril 20 milliGRAM(s) Oral daily    MEDICATIONS  (PRN):  morphine  - Injectable 4 milliGRAM(s) IV Push every 4 hours PRN Severe Pain (7 - 10)  morphine  - Injectable 2 milliGRAM(s) IV Push every 4 hours PRN Severe Pain (7 - 10)  ondansetron Injectable 4 milliGRAM(s) IV Push every 4 hours PRN Nausea      ALLERGIES:  Allergies    No Known Allergies    Intolerances        LABS:                        13.1   8.1   )-----------( 213      ( 23 Aug 2018 07:34 )             39.2     08-23    134<L>  |  98  |  9   ----------------------------<  118<H>  3.8   |  25  |  0.81    Ca    8.8      23 Aug 2018 07:34    TPro  5.9<L>  /  Alb  3.6  /  TBili  1.1  /  DBili  x   /  AST  18  /  ALT  13  /  AlkPhos  43  08-23          RADIOLOGY & ADDITIONAL TESTS: Reviewed US.

## 2018-08-23 NOTE — DISCHARGE NOTE ADULT - MEDICATION SUMMARY - MEDICATIONS TO STOP TAKING
I will STOP taking the medications listed below when I get home from the hospital:    hydrochlorothiazide-lisinopril

## 2018-08-23 NOTE — DISCHARGE NOTE ADULT - INSTRUCTIONS
Please attempt to abstain from alcohol. Please take in plenty of fluids, ideally water. Try to avoid heavily processed, fatty foods as these can cause further bouts of pancreatitis.

## 2018-08-23 NOTE — PROGRESS NOTE ADULT - PROBLEM SELECTOR PLAN 2
- takes lisinopril/HCTZ at home   - current SBP in the 130s and 140s  - started lisinopril but holding HCTZ

## 2018-08-23 NOTE — PROGRESS NOTE ADULT - PROBLEM SELECTOR PLAN 4
Low risk no need for DVT ppx   Can ambulate so no need for SCDs or Hep subQ  On Truvada for prep; currently not sexually active and no recent exposure and has agreed to get HIV tested in hospital (non-reactive)

## 2018-08-24 VITALS — TEMPERATURE: 99 F

## 2018-08-24 PROCEDURE — 85025 COMPLETE CBC W/AUTO DIFF WBC: CPT

## 2018-08-24 PROCEDURE — 76705 ECHO EXAM OF ABDOMEN: CPT

## 2018-08-24 PROCEDURE — 36415 COLL VENOUS BLD VENIPUNCTURE: CPT

## 2018-08-24 PROCEDURE — 99285 EMERGENCY DEPT VISIT HI MDM: CPT | Mod: 25

## 2018-08-24 PROCEDURE — 87389 HIV-1 AG W/HIV-1&-2 AB AG IA: CPT

## 2018-08-24 PROCEDURE — 96375 TX/PRO/DX INJ NEW DRUG ADDON: CPT

## 2018-08-24 PROCEDURE — 74018 RADEX ABDOMEN 1 VIEW: CPT

## 2018-08-24 PROCEDURE — 83690 ASSAY OF LIPASE: CPT

## 2018-08-24 PROCEDURE — 80307 DRUG TEST PRSMV CHEM ANLYZR: CPT

## 2018-08-24 PROCEDURE — 96374 THER/PROPH/DIAG INJ IV PUSH: CPT

## 2018-08-24 PROCEDURE — 74018 RADEX ABDOMEN 1 VIEW: CPT | Mod: 26

## 2018-08-24 PROCEDURE — 80053 COMPREHEN METABOLIC PANEL: CPT

## 2018-08-24 PROCEDURE — 93005 ELECTROCARDIOGRAM TRACING: CPT

## 2018-08-24 PROCEDURE — 80061 LIPID PANEL: CPT

## 2018-08-24 PROCEDURE — 99233 SBSQ HOSP IP/OBS HIGH 50: CPT | Mod: GC

## 2018-08-24 PROCEDURE — 85027 COMPLETE CBC AUTOMATED: CPT

## 2018-08-24 RX ORDER — LISINOPRIL/HYDROCHLOROTHIAZIDE 10-12.5 MG
0 TABLET ORAL
Qty: 0 | Refills: 0 | COMMUNITY

## 2018-08-24 RX ORDER — ACETAMINOPHEN 500 MG
650 TABLET ORAL EVERY 6 HOURS
Qty: 0 | Refills: 0 | Status: DISCONTINUED | OUTPATIENT
Start: 2018-08-24 | End: 2018-08-24

## 2018-08-24 RX ORDER — MORPHINE SULFATE 50 MG/1
1 CAPSULE, EXTENDED RELEASE ORAL EVERY 4 HOURS
Qty: 0 | Refills: 0 | Status: DISCONTINUED | OUTPATIENT
Start: 2018-08-24 | End: 2018-08-24

## 2018-08-24 RX ORDER — DOCUSATE SODIUM 100 MG
100 CAPSULE ORAL
Qty: 0 | Refills: 0 | Status: DISCONTINUED | OUTPATIENT
Start: 2018-08-24 | End: 2018-08-24

## 2018-08-24 RX ORDER — EMTRICITABINE AND TENOFOVIR DISOPROXIL FUMARATE 200; 300 MG/1; MG/1
1 TABLET, FILM COATED ORAL
Qty: 0 | Refills: 0 | COMMUNITY
Start: 2018-08-24

## 2018-08-24 RX ORDER — LISINOPRIL 2.5 MG/1
1 TABLET ORAL
Qty: 30 | Refills: 0 | OUTPATIENT
Start: 2018-08-24 | End: 2018-09-22

## 2018-08-24 RX ORDER — MORPHINE SULFATE 50 MG/1
2 CAPSULE, EXTENDED RELEASE ORAL EVERY 4 HOURS
Qty: 0 | Refills: 0 | Status: DISCONTINUED | OUTPATIENT
Start: 2018-08-24 | End: 2018-08-24

## 2018-08-24 RX ORDER — DOCUSATE SODIUM 100 MG
1 CAPSULE ORAL
Qty: 0 | Refills: 0 | COMMUNITY
Start: 2018-08-24

## 2018-08-24 RX ADMIN — MORPHINE SULFATE 4 MILLIGRAM(S): 50 CAPSULE, EXTENDED RELEASE ORAL at 06:26

## 2018-08-24 RX ADMIN — MORPHINE SULFATE 4 MILLIGRAM(S): 50 CAPSULE, EXTENDED RELEASE ORAL at 07:00

## 2018-08-24 RX ADMIN — LISINOPRIL 20 MILLIGRAM(S): 2.5 TABLET ORAL at 06:26

## 2018-08-24 RX ADMIN — SODIUM CHLORIDE 150 MILLILITER(S): 9 INJECTION, SOLUTION INTRAVENOUS at 11:44

## 2018-08-24 RX ADMIN — Medication 100 MILLIGRAM(S): at 17:27

## 2018-08-24 RX ADMIN — MORPHINE SULFATE 4 MILLIGRAM(S): 50 CAPSULE, EXTENDED RELEASE ORAL at 00:00

## 2018-08-24 RX ADMIN — Medication 100 MILLIGRAM(S): at 06:26

## 2018-08-24 RX ADMIN — EMTRICITABINE AND TENOFOVIR DISOPROXIL FUMARATE 1 TABLET(S): 200; 300 TABLET, FILM COATED ORAL at 11:44

## 2018-08-24 RX ADMIN — Medication 650 MILLIGRAM(S): at 19:37

## 2018-08-24 NOTE — PROGRESS NOTE ADULT - ATTENDING COMMENTS
Pt seen and examined by me at bedside earlier in AM. Agree with housestaff's exam/a/p as noted above with additions,   no flatus/bm since 2days ago. Denies n/v  VSS exam as above with additions  decrease BS/+distended/Mild TTP at epigastric region  labs reviewed   a/p:  1. Acute Pancreatitis 2/2 ETOH induced-c/w IVF, pain control, advance diet as tolerated  2. Abd distention likely 2/2 ileus; passing urine-serial abd exam, check AXR.   rest of a/p as above.

## 2018-08-24 NOTE — PROGRESS NOTE ADULT - PROBLEM SELECTOR PLAN 5
F: LR 200cc/hour  E: Replete for K<4 and Mg<2   N: Advance diet as tolerated    Dispo: Full Code F: LR 150cc/hour  E: Replete for K<4 and Mg<2   N: Advance diet as tolerated    Dispo: Full Code

## 2018-08-24 NOTE — PROGRESS NOTE ADULT - PROBLEM SELECTOR PLAN 3
-Stable at this time  Not on any medications for this at home
-Stable at this time  Not on any medications for this at home
Stable  Not on any medications for this at home

## 2018-08-24 NOTE — PROGRESS NOTE ADULT - ASSESSMENT
This is a 27 year old male with PMH HTN, gastric bypass, and on Truvada for PrEP who p/w epigastric pain and NBNB vomiting found to have alcohol induced pancreatitis. Currently on fluids, Zofran, and pain medications for management. Without BM or flatus for 2 days, abdominal XR ordered with likely ileus. Attempting to advance diet today with some decreased pain today.
This is a 27 year old male with PMH HTN, gastric bypass, and on Truvada for PrEP who p/w epigastric pain and NBNB vomiting found to have alcohol induced pancreatitis. Currently on fluids, Zofran, and pain medications for management.
This is a 27 year old male with PMH HTN, gastric bypass, and on Truvada for prep who p/w epigastric pain and NBNB vomiting found to have alcohol induced pancreatitis.

## 2018-08-24 NOTE — PROGRESS NOTE ADULT - PROBLEM SELECTOR PLAN 4
Low risk no need for DVT ppx   Can ambulate so no need for SCDs or Hep subQ  On Truvada for prep; currently not sexually active and no recent exposure and has agreed to get HIV tested in hospital (non-reactive) Low risk no need for DVT ppx   Being encouraged to ambulate so no need for SCDs or Hep subQ  On Truvada for prep; currently not sexually active and no recent exposure and has agreed to get HIV tested in hospital (non-reactive)

## 2018-08-24 NOTE — PROGRESS NOTE ADULT - PROBLEM SELECTOR PROBLEM 3
Gastric bypass status for obesity

## 2018-08-24 NOTE — PROGRESS NOTE ADULT - PROBLEM SELECTOR PLAN 1
Patient presented with epigastric pain and NBNB vomiting after binge drinking  chronic alcohol consumption; last drink at 2AM 8/21, CIWA is still 1; continuing to complain of abdominal pain.     -lipase elevated at 581  -BAL at 109   -s/p 2L NS   -s/p zofran and 4mg morphine  -pain controlled with morphine and will continue   -decrease LR at 200/hour   -lipid panel wnl  - daily CMP   - no gallstone seen on US  - will advance diet as tolerated Patient presented with epigastric pain and NBNB vomiting after binge drinking  chronic alcohol consumption; last drink at 2AM 8/21, CIWA is still 1; continuing to complain of abdominal pain, but decreased.     -lipase elevated at 581  -BAL at 109   -s/p 2L NS   - xofran PRn for nausea  -pain controlled PRN morphine and will continue   -decrease LR at 150/hour   -lipid panel wnl  - daily CMP   - no gallstone seen on US  - will advance diet as tolerated, now tolerating banana and sandwich

## 2018-08-24 NOTE — PROGRESS NOTE ADULT - SUBJECTIVE AND OBJECTIVE BOX
INTERVAL HPI/OVERNIGHT EVENTS: Overnight requested something for constipation and got colace.    SUBJECTIVE: Patient seen and examined at bedside. This morning the patient says he is feeling somewhat better, pain localized in the epigastric area but also some pain diffusely around abdomen. Decreased from yesterday. Feels distended. No flatus or BM for the past 2 days. Tell pty abdominal XR will be ordered for him for concern for ileus. No cp, nausea, vomiting, or diarrhea. Urinating well.    OBJECTIVE:    VITAL SIGNS:  ICU Vital Signs Last 24 Hrs  T(C): 36.5 (24 Aug 2018 08:57), Max: 38 (23 Aug 2018 20:43)  T(F): 97.7 (24 Aug 2018 08:57), Max: 100.4 (23 Aug 2018 20:43)  HR: 82 (24 Aug 2018 08:57) (79 - 83)  BP: 116/76 (24 Aug 2018 08:57) (116/76 - 133/84)  BP(mean): --  ABP: --  ABP(mean): --  RR: 16 (24 Aug 2018 08:57) (16 - 18)  SpO2: 100% (24 Aug 2018 08:57) (97% - 100%)        08-23 @ 07:01  -  08-24 @ 07:00  --------------------------------------------------------  IN: 1200 mL / OUT: 900 mL / NET: 300 mL    PHYSICAL EXAM:    General: NAD, young man laying down in bed  HEENT: NC/AT; PERRL, clear conjunctiva  Neck: supple, no JVD  Respiratory: lungs CTA b/l, no wheezes or rhonchi noted  Cardiovascular: +S1/S2; RRR, no murmurs, rubs, or gallops  Abdomen: distended, tender throughout with worst TTP in the epigastric area. Rare bowel sounds heard.   Extremities: WWP, 2+ peripheral pulses b/l; no LE edema  Skin: normal color and turgor; no rash  Neurological: AOx3, no focal deficits noted    MEDICATIONS:  MEDICATIONS  (STANDING):  docusate sodium 100 milliGRAM(s) Oral two times a day  emtricitabine 200 mG/tenofovir 300 mG (TRUVADA) 1 Tablet(s) Oral daily  lactated ringers. 1000 milliLiter(s) (150 mL/Hr) IV Continuous <Continuous>  lisinopril 20 milliGRAM(s) Oral daily    MEDICATIONS  (PRN):  morphine  - Injectable 4 milliGRAM(s) IV Push every 4 hours PRN Severe Pain (7 - 10)  morphine  - Injectable 2 milliGRAM(s) IV Push every 4 hours PRN Severe Pain (7 - 10)  ondansetron Injectable 4 milliGRAM(s) IV Push every 4 hours PRN Nausea      ALLERGIES:  Allergies    No Known Allergies    LABS:                        13.1   8.1   )-----------( 213      ( 23 Aug 2018 07:34 )             39.2     08-23    134<L>  |  98  |  9   ----------------------------<  118<H>  3.8   |  25  |  0.81    Ca    8.8      23 Aug 2018 07:34    TPro  5.9<L>  /  Alb  3.6  /  TBili  1.1  /  DBili  x   /  AST  18  /  ALT  13  /  AlkPhos  43  08-23          RADIOLOGY & ADDITIONAL TESTS:   Abdominal XR reviewed.

## 2018-08-30 DIAGNOSIS — I10 ESSENTIAL (PRIMARY) HYPERTENSION: ICD-10-CM

## 2018-08-30 DIAGNOSIS — R10.9 UNSPECIFIED ABDOMINAL PAIN: ICD-10-CM

## 2018-08-30 DIAGNOSIS — K85.20 ALCOHOL INDUCED ACUTE PANCREATITIS WITHOUT NECROSIS OR INFECTION: ICD-10-CM

## 2018-08-30 DIAGNOSIS — Z98.84 BARIATRIC SURGERY STATUS: ICD-10-CM

## 2018-08-30 DIAGNOSIS — F10.10 ALCOHOL ABUSE, UNCOMPLICATED: ICD-10-CM

## 2018-08-30 DIAGNOSIS — K56.7 ILEUS, UNSPECIFIED: ICD-10-CM

## 2018-12-07 NOTE — PATIENT PROFILE ADULT. - NSALCOHOLLASTUSE_GEN_A_CORE_DT
"PRAMIPEXOLE 0.125MG TABLETS  Last Written Prescription Date:  09/13/18  Last Fill Quantity: 180,  # refills: 0   Last office visit: 2/5/2018 with prescribing provider:  02/05/18   Future Office Visit:    Requested Prescriptions   Pending Prescriptions Disp Refills     pramipexole (MIRAPEX) 0.125 MG tablet [Pharmacy Med Name: PRAMIPEXOLE 0.125MG TABLETS] 180 tablet 0     Sig: TAKE 1 TABLET BY MOUTH TWICE DAILY    Antiparkinson's Agents Protocol Failed    12/6/2018  7:17 PM       Failed - CBC on record in past 12 months    Recent Labs   Lab Test  06/28/17   1355   WBC  12.7*   RBC  3.70*   HGB  11.8   HCT  35.6   PLT  245                Failed - ALT on record in past 12 months        Recent Labs   Lab Test  06/27/17   1040   ALT  22            Failed - Serum Creatinine on file in past 12 months    Recent Labs   Lab Test  06/28/17   0650   CR  0.74            Failed - Recent (6 mo) or future (30 days) visit within the authorizing provider's specialty    Patient had office visit in the last 6 months or has a visit in the next 30 days with authorizing provider or within the authorizing provider's specialty.  See \"Patient Info\" tab in inbasket, or \"Choose Columns\" in Meds & Orders section of the refill encounter.           Passed - Blood pressure under 140/90 in past 12 months    BP Readings from Last 3 Encounters:   02/05/18 124/80   02/02/18 100/60   01/24/18 120/72                Passed - Patient is age 18 or older       Passed - No active pregnancy on record       Passed - No positive pregnancy test in the past 12 months          " 21-Aug-2018

## 2019-02-02 NOTE — ED ADULT NURSE NOTE - OBJECTIVE STATEMENT
pt received into spot 20 A&Ox3 ambulatory appears comfortable arrives via walk in triage with complaints of epigastric pain N/V x 3 hrs prior to arrival reports hx of pancreatitis feels like typical flare up. Denies fever chills diarrhea constipation CP SOB lightheadedness dizziness weakness sick contacts. 20G palced to LAC labs drawn and sent pt actively vomiting appears clear like water and spit no blood noted. 12 lead EKG done NSR noted awaiting MD marcelino will monitor and reassess pt in NAD informed and agreeable to plan
normal...

## 2020-06-19 NOTE — PROGRESS NOTE ADULT - PROBLEM SELECTOR PROBLEM 1
[facility]    Progress Note    Girl Beckius Patient Status:      2020 MRN [id number]   Location [facility]  3SE-N Attending Rick St MD   Hosp Day # 2 PCP No primary care provider on file.      Subjective:     Fee
REITCPERCENT    No results found for: CREATSERUM, BUN, NA, K, CL, CO2, GLU, CA, ALB, ALKPHO, TP, AST, ALT, PTT, INR, PTP, T4F, TSH, TSHREFLEX, DANGELO, LIP, GGT, PSA, DDIMER, ESRML, ESRPF, CRP, BNP, MG, PHOS, TROP, CK, CKMB, EMILIE, RPR, B12, ETOH, POCGLU    Bloo
Pancreatitis

## 2022-08-18 NOTE — DISCHARGE NOTE ADULT - THE PATIENT HAS
"  [facility]   Physical Therapy Treatment Note     Name: Josefina Martin  Clinic Number: [id number]    Therapy Diagnosis:   Encounter Diagnoses   Name Primary?    Weakness of right lower extremity Yes    Gait abnormality      Physician: Donny Cerrato MD    Visit Date: 8/18/2022    Physician Orders: PT Eval and Treat   Medical Diagnosis from Referral: I63.9 (ICD-10-CM) - Cerebral vascular accident  Evaluation Date: 8/4/2022  Authorization Period Expiration: 12/31/2022  Plan of Care Expiration: 9/30/2022  Visit # / Visits authorized: 5/ 1  FOTO #: 4/5  PTA Visit #: 3/5     Time In: 8:45 am  Time Out: 9:30 am  Total Billable Time: 45 minutes ( 3 TE - MEDICAID)    Precautions: Standard, Diabetes, Fall, CHF and History of CVAx1 and MIx1    SUBJECTIVE     Pt reports: Was sick on Monday and missed her appointment but is feeling much better today.  She presents to clinic without rolling walker today and with an improved gait and subsequent decreased R foot planter forefoot pain.  She was compliant with home exercise program.   Response to previous treatment: no adverse response  Functional change: no rolling walker for ambulation and less foot pain    Pain: 4/10  Location: right foot      OBJECTIVE     Objective Measures updated at progress report unless specified.     Treatment     Josefina received the treatments listed below:      therapeutic exercises to develop strength, endurance, ROM and flexibility for 25 minutes including:  Standing Gastroc stretch on fitter board: 3x30"  Standing HS curls: 2x10 B 2# weights  Standing hip abduction/extension: 2x10 2# weights  Standing marching: 2x10 with 2# weights  Overground in clinic ambulation: 300 feet x 2 trials     Not performed today:  Seated dorsiflexion: x20  Seated marching: 2x20 2# weights  Bridges: 2x10  Clamshells: 2x10 B with red theraband  LAQ: 2x10 B with 2# weights  Prone HS Curl: 2x10 2# weights    neuromuscular re-education " "activities to improve: Balance, Coordination, Kinesthetic and Proprioception for 20 minutes. The following activities were included:  Nustep 3.0 for 8 minutes  Standing rockerboard dorsiflexion/plantarflexion 2' with 3" holds  Standing Heel/toe raises: 2x10  Sit<>stand: 2x10 with RLE staggered (no UE support)  Heel walking with bilateral upper extremity support 4 lengths x 8 feet  Step ups: 6" forward 2x10 B                        lateral up and over x20    Not performed today:  Step fan: 2' x 2 B      Patient Education and Home Exercises     Home Exercises Provided and Patient Education Provided     Education provided:   - daily HEP compliance    Written Home Exercises Provided: yes. Exercises were reviewed and Josefina was able to demonstrate them prior to the end of the session.  Josefina demonstrated good  understanding of the education provided. See EMR under Patient Instructions for exercises provided during therapy sessions    ASSESSMENT     Josefina arrived to session with 4/10 reported R plantar foot pain and was agreeable to treatment. Pt presented to clinic for first time without an assistive device and an improved gait since evaluation.  She no longer ambulating with R toe drag but is still walking on her forefoot despite cuing for heel strike and education on correlation between self imposed walking style and R plantar forefoot pain.  Pt was able to tolerate entire session in standing today and was able to progress all strengthening, see bold, without adverse response.  Inconsistencies with effort throughout session persists, but overall pt is showing improvement since initial evaluation.  Josefina would benefit from continued PT services to achieve functional goals.      Josefina Is progressing well towards her goals.   Pt prognosis is Fair.     Pt will continue to benefit from skilled outpatient physical therapy to address the deficits listed in the problem list box on initial evaluation, " provide pt/family education and to maximize pt's level of independence in the home and community environment.     Pt's spiritual, cultural and educational needs considered and pt agreeable to plan of care and goals.     Anticipated barriers to physical therapy: History of prior CVA x 2    Goals:   Short Term Goals: 4 weeks   1. Patient will be compliant with HEP in order to maximize PT benefits  2. Patient will improve self-selected walking speed with least restrictive assistive device to >/=0.6m/s in order to improve home/community ambulation capacity   3. Patient will score </= 20 Seconds on Five TIme Sit to  order to improve bilateral lower extremity endurance and muscular power for transfers      Long Term Goals: 8 weeks   1. Patient will improve bilateral lower extremity MMT grades to >/=4/5 in major muscle groups in order to improve strength for ADL completion  2. Patient will improve self-selected walking speed with least restrictive assistive device to >/=0.8m/s in order to improve home/community ambulation capacity   3. Patient will score </= 16 Seconds on Five TIme Sit to  order to improve bilateral lower extremity endurance and muscular power for transfers   4. Patient will perform 1 floor <> stand transfer with bilateral upper extremity support in order to demonstrate safe functional mobility in case of future fall  5. Patient will report 0 falls from initiation of PT management  6. Patient will begin some form of home/community fitness in order to sustain progress gained in PT      PLAN     Plan to cont with progression of PT goals per POC.    Lalita Nagel, PTA      no difficulties

## 2022-11-23 NOTE — ED ADULT NURSE NOTE - RN DISCHARGE SIGNATURE
[facility] Counseling:  I discussed with the patient the risks of ustekinumab including but not limited to immunosuppression, malignancy, posterior leukoencephalopathy syndrome, and serious infections.  The patient understands that monitoring is required including a PPD at baseline and must alert us or the primary physician if symptoms of infection or other concerning signs are noted. 27-May-2018